# Patient Record
Sex: MALE | Race: WHITE | NOT HISPANIC OR LATINO | URBAN - METROPOLITAN AREA
[De-identification: names, ages, dates, MRNs, and addresses within clinical notes are randomized per-mention and may not be internally consistent; named-entity substitution may affect disease eponyms.]

---

## 2017-06-15 ENCOUNTER — COMMUNICATION - RIVER FALLS (OUTPATIENT)
Dept: FAMILY MEDICINE | Facility: CLINIC | Age: 47
End: 2017-06-15

## 2017-06-15 ENCOUNTER — AMBULATORY - RIVER FALLS (OUTPATIENT)
Dept: FAMILY MEDICINE | Facility: CLINIC | Age: 47
End: 2017-06-15

## 2017-06-16 LAB
CHOLEST SERPL-MCNC: 174 MG/DL (ref 125–200)
CHOLEST/HDLC SERPL: 2.3 {RATIO}
CREAT SERPL-MCNC: 0.82 MG/DL (ref 0.6–1.35)
GLUCOSE BLD-MCNC: 100 MG/DL (ref 65–99)
HDLC SERPL-MCNC: 77 MG/DL
LDLC SERPL CALC-MCNC: 85 MG/DL
NONHDLC SERPL-MCNC: 97 MG/DL
TRIGL SERPL-MCNC: 62 MG/DL

## 2017-06-23 ENCOUNTER — OFFICE VISIT - RIVER FALLS (OUTPATIENT)
Dept: FAMILY MEDICINE | Facility: CLINIC | Age: 47
End: 2017-06-23

## 2017-11-03 ENCOUNTER — AMBULATORY - RIVER FALLS (OUTPATIENT)
Dept: FAMILY MEDICINE | Facility: CLINIC | Age: 47
End: 2017-11-03

## 2017-11-04 LAB
GLUCOSE BLD-MCNC: 91 MG/DL (ref 65–99)
HBA1C MFR BLD: 5.1 %

## 2018-08-17 ENCOUNTER — AMBULATORY - RIVER FALLS (OUTPATIENT)
Dept: FAMILY MEDICINE | Facility: CLINIC | Age: 48
End: 2018-08-17

## 2018-08-18 LAB
CREAT SERPL-MCNC: 0.78 MG/DL (ref 0.7–1.33)
GLUCOSE BLD-MCNC: 94 MG/DL (ref 65–99)

## 2018-08-24 ENCOUNTER — OFFICE VISIT - RIVER FALLS (OUTPATIENT)
Dept: FAMILY MEDICINE | Facility: CLINIC | Age: 48
End: 2018-08-24

## 2018-08-24 ASSESSMENT — MIFFLIN-ST. JEOR: SCORE: 1494.23

## 2019-06-04 ENCOUNTER — OFFICE VISIT - RIVER FALLS (OUTPATIENT)
Dept: FAMILY MEDICINE | Facility: CLINIC | Age: 49
End: 2019-06-04

## 2019-06-04 ASSESSMENT — MIFFLIN-ST. JEOR: SCORE: 1482.44

## 2019-06-06 ENCOUNTER — OFFICE VISIT - RIVER FALLS (OUTPATIENT)
Dept: FAMILY MEDICINE | Facility: CLINIC | Age: 49
End: 2019-06-06

## 2019-06-06 ASSESSMENT — MIFFLIN-ST. JEOR: SCORE: 1482.44

## 2019-07-25 ENCOUNTER — OFFICE VISIT - RIVER FALLS (OUTPATIENT)
Dept: FAMILY MEDICINE | Facility: CLINIC | Age: 49
End: 2019-07-25

## 2019-07-25 ASSESSMENT — MIFFLIN-ST. JEOR: SCORE: 1472.46

## 2019-09-10 ENCOUNTER — AMBULATORY - RIVER FALLS (OUTPATIENT)
Dept: FAMILY MEDICINE | Facility: CLINIC | Age: 49
End: 2019-09-10

## 2019-09-11 ENCOUNTER — COMMUNICATION - RIVER FALLS (OUTPATIENT)
Dept: FAMILY MEDICINE | Facility: CLINIC | Age: 49
End: 2019-09-11

## 2019-09-11 LAB
BUN SERPL-MCNC: 10 MG/DL (ref 7–25)
BUN/CREAT RATIO - HISTORICAL: ABNORMAL (ref 6–22)
CALCIUM SERPL-MCNC: 10.1 MG/DL (ref 8.6–10.3)
CHLORIDE BLD-SCNC: 98 MMOL/L (ref 98–110)
CHOLEST SERPL-MCNC: 152 MG/DL
CHOLEST/HDLC SERPL: 1.8 {RATIO}
CO2 SERPL-SCNC: 23 MMOL/L (ref 20–32)
CREAT SERPL-MCNC: 0.74 MG/DL (ref 0.7–1.33)
EGFRCR SERPLBLD CKD-EPI 2021: 107 ML/MIN/1.73M2
GLUCOSE BLD-MCNC: 92 MG/DL (ref 65–99)
HDLC SERPL-MCNC: 84 MG/DL
LDLC SERPL CALC-MCNC: 56 MG/DL
NONHDLC SERPL-MCNC: 68 MG/DL
POTASSIUM BLD-SCNC: 4.7 MMOL/L (ref 3.5–5.3)
SODIUM SERPL-SCNC: 132 MMOL/L (ref 135–146)
TRIGL SERPL-MCNC: 42 MG/DL

## 2019-09-17 ENCOUNTER — OFFICE VISIT - RIVER FALLS (OUTPATIENT)
Dept: FAMILY MEDICINE | Facility: CLINIC | Age: 49
End: 2019-09-17

## 2019-09-17 ASSESSMENT — MIFFLIN-ST. JEOR: SCORE: 1486.97

## 2020-07-14 ENCOUNTER — OFFICE VISIT - RIVER FALLS (OUTPATIENT)
Dept: FAMILY MEDICINE | Facility: CLINIC | Age: 50
End: 2020-07-14

## 2020-07-14 LAB
ALBUMIN UR-MCNC: NEGATIVE G/DL
BILIRUB UR QL STRIP: NEGATIVE
GLUCOSE UR STRIP-MCNC: NEGATIVE MG/DL
HGB UR QL STRIP: NEGATIVE
KETONES UR STRIP-MCNC: ABNORMAL MG/DL
LEUKOCYTE ESTERASE UR QL STRIP: NEGATIVE
NITRATE UR QL: NEGATIVE
PH UR STRIP: 6 [PH] (ref 5–8)
SP GR UR STRIP: 1.01 (ref 1–1.03)

## 2020-07-14 ASSESSMENT — MIFFLIN-ST. JEOR: SCORE: 1437.98

## 2020-07-15 ENCOUNTER — COMMUNICATION - RIVER FALLS (OUTPATIENT)
Dept: FAMILY MEDICINE | Facility: CLINIC | Age: 50
End: 2020-07-15

## 2020-07-15 LAB
A/G RATIO - HISTORICAL: 1.6 (ref 1–2.5)
ALBUMIN SERPL-MCNC: 4.9 GM/DL (ref 3.6–5.1)
ALP SERPL-CCNC: 81 UNIT/L (ref 35–144)
ALT SERPL W P-5'-P-CCNC: 19 UNIT/L (ref 9–46)
AST SERPL W P-5'-P-CCNC: 21 UNIT/L (ref 10–35)
BILIRUB DIRECT SERPL-MCNC: 0.1 MG/DL
BILIRUB INDIRECT SERPL-MCNC: 0.5 MG/DL (ref 0.2–1.2)
BILIRUB SERPL-MCNC: 0.6 MG/DL (ref 0.2–1.2)
BUN SERPL-MCNC: 9 MG/DL (ref 7–25)
BUN/CREAT RATIO - HISTORICAL: ABNORMAL (ref 6–22)
CALCIUM SERPL-MCNC: 10.4 MG/DL (ref 8.6–10.3)
CHLORIDE BLD-SCNC: 94 MMOL/L (ref 98–110)
CO2 SERPL-SCNC: 25 MMOL/L (ref 20–32)
CREAT SERPL-MCNC: 0.71 MG/DL (ref 0.7–1.33)
EGFRCR SERPLBLD CKD-EPI 2021: 108 ML/MIN/1.73M2
ERYTHROCYTE [DISTWIDTH] IN BLOOD BY AUTOMATED COUNT: 13.2 % (ref 11–15)
GLOBULIN: 3.1 (ref 1.9–3.7)
GLUCOSE BLD-MCNC: 87 MG/DL (ref 65–99)
HCT VFR BLD AUTO: 48.5 % (ref 38.5–50)
HGB BLD-MCNC: 17.6 GM/DL (ref 13.2–17.1)
MCH RBC QN AUTO: 32.1 PG (ref 27–33)
MCHC RBC AUTO-ENTMCNC: 36.3 GM/DL (ref 32–36)
MCV RBC AUTO: 88.5 FL (ref 80–100)
PLATELET # BLD AUTO: 218 10*3/UL (ref 140–400)
PMV BLD: 9.5 FL (ref 7.5–12.5)
POTASSIUM BLD-SCNC: 5 MMOL/L (ref 3.5–5.3)
PROT SERPL-MCNC: 8 GM/DL (ref 6.1–8.1)
RBC # BLD AUTO: 5.48 10*6/UL (ref 4.2–5.8)
SODIUM SERPL-SCNC: 129 MMOL/L (ref 135–146)
WBC # BLD AUTO: 10.5 10*3/UL (ref 3.8–10.8)

## 2020-07-16 ENCOUNTER — COMMUNICATION - RIVER FALLS (OUTPATIENT)
Dept: FAMILY MEDICINE | Facility: CLINIC | Age: 50
End: 2020-07-16

## 2020-07-16 LAB — BACTERIA SPEC CULT: NORMAL

## 2022-02-11 VITALS
HEART RATE: 76 BPM | WEIGHT: 154 LBS | BODY MASS INDEX: 24.17 KG/M2 | HEIGHT: 67 IN | SYSTOLIC BLOOD PRESSURE: 168 MMHG | DIASTOLIC BLOOD PRESSURE: 100 MMHG

## 2022-02-11 VITALS
TEMPERATURE: 97 F | BODY MASS INDEX: 23.92 KG/M2 | WEIGHT: 152.4 LBS | DIASTOLIC BLOOD PRESSURE: 76 MMHG | HEART RATE: 77 BPM | SYSTOLIC BLOOD PRESSURE: 154 MMHG | OXYGEN SATURATION: 97 % | HEIGHT: 67 IN

## 2022-02-11 VITALS
HEART RATE: 72 BPM | HEIGHT: 67 IN | DIASTOLIC BLOOD PRESSURE: 72 MMHG | BODY MASS INDEX: 23.76 KG/M2 | DIASTOLIC BLOOD PRESSURE: 80 MMHG | HEIGHT: 67 IN | HEART RATE: 80 BPM | WEIGHT: 151.4 LBS | TEMPERATURE: 97.5 F | TEMPERATURE: 98 F | WEIGHT: 149.2 LBS | BODY MASS INDEX: 23.42 KG/M2 | BODY MASS INDEX: 23.76 KG/M2 | DIASTOLIC BLOOD PRESSURE: 82 MMHG | SYSTOLIC BLOOD PRESSURE: 138 MMHG | HEART RATE: 80 BPM | TEMPERATURE: 98.6 F | SYSTOLIC BLOOD PRESSURE: 130 MMHG | WEIGHT: 151.4 LBS | HEIGHT: 67 IN | SYSTOLIC BLOOD PRESSURE: 140 MMHG

## 2022-02-11 VITALS — DIASTOLIC BLOOD PRESSURE: 88 MMHG | HEART RATE: 80 BPM | SYSTOLIC BLOOD PRESSURE: 142 MMHG

## 2022-02-11 VITALS
WEIGHT: 151.8 LBS | SYSTOLIC BLOOD PRESSURE: 148 MMHG | HEART RATE: 88 BPM | DIASTOLIC BLOOD PRESSURE: 92 MMHG | TEMPERATURE: 97.6 F

## 2022-02-11 VITALS
BODY MASS INDEX: 22.22 KG/M2 | HEIGHT: 67 IN | TEMPERATURE: 97.4 F | WEIGHT: 141.6 LBS | DIASTOLIC BLOOD PRESSURE: 90 MMHG | SYSTOLIC BLOOD PRESSURE: 152 MMHG | HEART RATE: 76 BPM

## 2022-02-16 NOTE — PROGRESS NOTES
Patient:   MANNY DIAL            MRN: [id number]            FIN: [id number]               Age:   49 years     Sex:  Male     :  1970   Associated Diagnoses:   Impacted cerumen   Author:   Saw PINEDA, Maninder      Procedure   Ear foreign body removal procedure   Date/ Time:  2019 3:02:00 PM.     Confirmed: patient.     Performed by: self.     Informed consent: Verbally obtained.     Indication: hearing disturbance, impacted Cerumen.     Location: left ear, right ear.     Preparation and technique: positioned sitting upright, method including (cerumen loop, curette, irrigation with warm tap water, otologic syringe).     Results: foreign body removal complete.     Procedure tolerated: well.     No Complications.        Impression and Plan   Diagnosis     Impacted cerumen (KUZ20-HB H61.23).     Orders     F/U  if not improving, sooner if getting worse.

## 2022-02-16 NOTE — CARE COORDINATION
Pt appears on [facility] chronic disease panel as out of parameters for _HTN/Tobacco_  Pt has RTC for 6/2018 CDM, RTC placed today for CSS BP in 3 weeks.  Provider has discussed   smoking cessation with pt and he is try to quit per note 6/23/2017

## 2022-02-16 NOTE — CARE COORDINATION
Pt appears on  [facility] chronic disease panel as out of parameters for elevated BP.  RTC placed for CSS only BP check.    Rosalinda Segovia CMA.

## 2022-02-16 NOTE — NURSING NOTE
Quick Intake Entered On:  6/4/2019 3:04 PM CDT    Performed On:  6/4/2019 3:04 PM CDT by Maninder Spring MD               Summary   Systolic Blood Pressure :   138 mmHg (HI)    Diastolic Blood Pressure :   82 mmHg (HI)    Mean Arterial Pressure :   101 mmHg   Maninder Spring MD - 6/4/2019 3:04 PM CDT

## 2022-02-16 NOTE — NURSING NOTE
CAGE Assessment Entered On:  9/17/2019 4:15 PM CDT    Performed On:  9/17/2019 4:15 PM CDT by Kelli Teague CMA               Assessment   Have you ever felt you should cut down on your drinking :   No   Have people annoyed you by criticizing your drinking :   No   Have you ever felt bad or guilty about your drinking :   No   Have you ever taken a drink first thing in the morning to steady your nerves or get rid of a hangover (Eye-opener) :   No   CAGE Score :   0    eKlli Teague CMA - 9/17/2019 4:15 PM CDT

## 2022-02-16 NOTE — NURSING NOTE
Depression Screening Entered On:  9/17/2019 4:15 PM CDT    Performed On:  9/17/2019 4:15 PM CDT by Kelli Teague CMA               Depression Screening   Little Interest - Pleasure in Activities :   Not at all   Feeling Down, Depressed, Hopeless :   Not at all   Initial Depression Screen Score :   0    Trouble Falling or Staying Asleep :   Not at all   Feeling Tired or Little Energy :   Not at all   Poor Appetite or Overeating :   Not at all   Feeling Bad About Yourself :   Not at all   Trouble Concentrating :   Not at all   Moving or Speaking Slowly :   Not at all   Thoughts Better Off Dead or Hurting Self :   Not at all   Detailed Depression Screen Score :   0    Total Depression Screen Score :   0    GISEL Difficulty with Work, Home, Others :   Not difficult at all   Kelli Teague CMA - 9/17/2019 4:15 PM CDT

## 2022-02-16 NOTE — LETTER
(Inserted Image. Unable to display)   August 26, 2019      MANNY DIAL  [address] [place] [id number]        Dear MANNY,      Thank you for selecting [facility] (previously [place] [place] [facility]) for your healthcare needs.     Our records indicate you are due for the following services:    Medication Check  Fasting Lab Tests ~ Please do not eat or drink anything 10 hours prior to your scheduled appointment time.                (Water and any medications that you may need are allowed unless directed otherwise.)    If you had your labs done at another facility or with [facility] at [facility], please bring in a copy of the results to your next visit, mail a copy, or drop off a copy of your results to your Healthcare Provider.    You are due for lab work and an office visit; please schedule the lab appointment 1 week before the office visit.  This will assure all results are available to discuss with your provider during your visit.    **It is very helpful if you bring your medication bottles to your appointment.  This assures we have all of your current medications, including strength and dosing information, documented accurately in your medical record.    To schedule an appointment or if you have further questions, please contact your primary clinic:   [facility]  [phone]   [facility]  [phone]             [facility]      [phone]      Powered by [employer]    Sincerely,    Tripp Dutta PA-C

## 2022-02-16 NOTE — NURSING NOTE
Comprehensive Intake Entered On:  6/4/2019 2:45 PM CDT    Performed On:  6/4/2019 2:41 PM CDT by Ora Burton MA               Summary   Chief Complaint :   Right Ear blockage x 4 days   Weight Measured :   151.4 lb(Converted to: 151 lb 6 oz, 68.67 kg)    Height Measured :   66.5 in(Converted to: 5 ft 6 in, 168.91 cm)    Body Mass Index :   24.07 kg/m2   Body Surface Area :   1.79 m2   Systolic Blood Pressure :   170 mmHg (HI)    Diastolic Blood Pressure :   90 mmHg (HI)    Mean Arterial Pressure :   117 mmHg   Peripheral Pulse Rate :   72 bpm   BP Site :   Right arm   Pulse Site :   Radial artery   BP Method :   Manual   HR Method :   Manual   Temperature Tympanic :   98.6 DegF(Converted to: 37.0 DegC)    Ora Burton MA - 6/4/2019 2:41 PM CDT   Health Status   Allergies Verified? :   Yes   Medication History Verified? :   Yes   Medical History Verified? :   Yes   Pre-Visit Planning Status :   Completed   Tobacco Use? :   Current every day smoker   Ora Burton MA - 6/4/2019 2:41 PM CDT   Consents   Consent for Immunization Exchange :   Consent Granted   Consent for Immunizations to Providers :   Consent Granted   Ora Burton MA - 6/4/2019 2:41 PM CDT   Meds / Allergies   (As Of: 6/4/2019 2:45:04 PM CDT)   Allergies (Active)   No Known Medication Allergies  Estimated Onset Date:   Unspecified ; Created By:   Kelli Teague CMA; Reaction Status:   Active ; Category:   Drug ; Substance:   No Known Medication Allergies ; Type:   Allergy ; Updated By:   Kelli Teague CMA; Reviewed Date:   6/4/2019 2:43 PM CDT        Medication List   (As Of: 6/4/2019 2:45:04 PM CDT)   Prescription/Discharge Order    amlodipine-telmisartan  :   amlodipine-telmisartan ; Status:   Prescribed ; Ordered As Mnemonic:   amlodipine-telmisartan 10 mg-40 mg oral tablet ; Simple Display Line:   1 tab(s), Oral, daily, med change, 90 tab(s), 3 Refill(s) ; Ordering Provider:   Tripp Dutta PA-C; Catalog Code:   amlodipine-telmisartan ;  Order Dt/Tm:   8/24/2018 3:54:45 PM

## 2022-02-16 NOTE — LETTER
(Inserted Image. Unable to display)   July 16, 2020      MANNY DIAL      [address] [place], [place] [id number]        Dear MANNY,    Thank you for selecting [facility] for your healthcare needs.  Below you will find the results of your recent tests done at our clinic.     Urine culture with no bacterial growth      Result Name Current Result   Urine Culture  See comment 7/14/2020       Please contact me or my assistant at [phone] if you have any questions about your results.     Sincerely,        Darci Monge MD        What do your labs mean?  Below is a glossary of commonly ordered labs:  LDL   Bad Cholesterol   HDL   Good Cholesterol  AST/ALT   Liver Function   Cr/Creatinine   Kidney Function  Microalbumin   Kidney Function  BUN   Kidney Function  PSA   Prostate    TSH   Thyroid Hormone  HgbA1c   Diabetes Test   Hgb (Hemoglobin)   Red Blood Cells

## 2022-02-16 NOTE — PROGRESS NOTES
Patient:   MANNY DIAL            MRN: [id number]            FIN: [id number]               Age:   47 years     Sex:  Male     :  1970   Associated Diagnoses:   Hypertension; IGT (impaired glucose tolerance)   Author:   Tripp Dutta PA-C      Visit Information      Date of Service: 2017 03:26 pm  Performing Location: [facility]  Encounter#: [id number]      Primary Care Provider (PCP):  Tripp Dutta PA-C    NPI# [id number]      Referring Provider:  Tripp Dutta PA-C# [id number]   Visit type:  Scheduled follow-up.    Accompanied by:  No one.    Source of history:  Self, Medical record.       Chief Complaint   2017 3:29 PM CDT    HTN med check and med refil        History of Present Illness             The patient presents for follow-up evaluation of hypertension.  The quality of hypertension symptom(s) since the patient's last visit is described as decreased.  The severity of the hypertension symptom(s) since the last visit is moderate.  Since the patient's last visit, the timing/course of hypertension symptom(s) is constant.  Off Meds since November. Lifestyle changes. Feels great. Still smoking but has cut down. CC above noted and confirmed with the patient. No side effects. Recent labs..        Review of Systems   Constitutional:  Negative.    Respiratory:  Negative.    Cardiovascular:  Negative except as documented in history of present illness.       Health Status   Allergies:    Allergic Reactions (All)  No Known Medication Allergies   Medications:  (Selected)   Prescriptions  Prescribed  amLODIPine 10 mg oral tablet: 1 tab(s) ( 10 mg ), PO, Daily, Instructions: new dose, # 90 tab(s), 3 Refill(s), Type: Maintenance, Pharmacy: [facility] #[id number], 1 tab(s) po daily,Instr:new dose   Problem list:    All Problems  Hypertension / SNOMED CT 8362059949 / Confirmed      Histories   Past Medical History:    No active or resolved past medical history items have been selected  or recorded.   Family History:    No family history items have been selected or recorded.   Procedure history:    No active procedure history items have been selected or recorded.   Social History:             No active social history items have been recorded.      Physical Examination   Vital Signs   6/23/2017 3:29 PM CDT Temperature Tympanic 97.6 DegF  LOW    Peripheral Pulse Rate 88 bpm    Pulse Site Radial artery    HR Method Manual    Systolic Blood Pressure 150 mmHg  HI    Diastolic Blood Pressure 98 mmHg  HI    Mean Arterial Pressure 115 mmHg    BP Site Right arm    BP Method Manual      Measurements from flowsheet : Measurements   6/23/2017 3:29 PM CDT    Weight Measured - Standard                151.8 lb     General:  Alert and oriented, No acute distress.    Eye:  Pupils are equal, round and reactive to light, Extraocular movements are intact, Normal conjunctiva.    HENT:  Normocephalic, Tympanic membranes are clear, Oral mucosa is moist, No pharyngeal erythema.    Neck:  Supple, Non-tender, No lymphadenopathy.    Respiratory:  Lungs are clear to auscultation, Respirations are non-labored, Breath sounds are equal.    Cardiovascular:  Normal rate, Regular rhythm, No murmur.    Gastrointestinal:  Soft, Non-tender, Non-distended, Normal bowel sounds, No organomegaly.       Review / Management   Results review:  Lab results   6/15/2017 10:48 AM CDT Sodium Level 130 mmol/L  LOW    Potassium Level 4.7 mmol/L    Chloride Level 99 mmol/L    CO2 Level 24 mmol/L    Glucose Level 100 mg/dL  HI    BUN 11 mg/dL    Creatinine Level 0.82 mg/dL    BUN/Creat Ratio NOT APPLICABLE    eGFR 104 mL/min/1.73m2    eGFR African American 120 mL/min/1.73m2    Calcium Level 9.9 mg/dL    Cholesterol 174 mg/dL    Non-HDL Cholesterol 97    HDL 77 mg/dL    Cholesterol/HDL Ratio 2.3    LDL 85    Triglyceride 62 mg/dL    WBC 9.1    RBC 5.42    Hgb 17.2 gm/dL  HI    Hct 49.0 %    MCV 90.4 fL    MCH 31.7 pg    MCHC 35.1 gm/dL    RDW 13.3 %     Platelet 234    MPV 9.5 fL   .       Impression and Plan   Diagnosis     Hypertension (GOI92-HO I10).     IGT (impaired glucose tolerance) (FXZ73-HS R73.02).     Patient Instructions:       Counseled: Patient, Regarding diagnosis, Regarding treatment, Regarding medications, Diet, Activity, Smoking cessation, Verbalized understanding.    Orders     Orders (Selected)   Outpatient Orders  Ordered  Return to Clinic (Request): RFV: FBS A1c, Return in 3 months  Prescriptions  Prescribed  amLODIPine 10 mg oral tablet: 1 tab(s) ( 10 mg ), PO, Daily, Instructions: new dose, # 90 tab(s), 3 Refill(s), Type: Maintenance, Pharmacy: [facility] #[id number], 1 tab(s) po daily,Instr:new dose.     FU for labs in 3 months. BP check in 3-4 weeks after back on medications.

## 2022-02-16 NOTE — LETTER
(Inserted Image. Unable to display)   [address] [place]  [address]  [phone]    September 11, 2019      MANNY YOJANA      [address] [place] [id number]        Dear MANNY,    Thank you for selecting [facility] for your healthcare needs. Below you will find the result of your recent test(s) done at our clinic.     These are excellent      Result Name Current Result Previous Result Reference Range   Sodium Level (mmol/L) ((L)) 132 9/10/2019 ((L)) 133 8/17/2018 135 - 146   Potassium Level (mmol/L)  4.7 9/10/2019  5.2 8/17/2018 3.5 - 5.3   Chloride Level (mmol/L)  98 9/10/2019  102 8/17/2018 98 - 110   CO2 Level (mmol/L)  23 9/10/2019  25 8/17/2018 20 - 32   Glucose Level (mg/dL)  92 9/10/2019  94 8/17/2018 65 - 99   BUN (mg/dL)  10 9/10/2019  13 8/17/2018 7 - 25   Creatinine Level (mg/dL)  0.74 9/10/2019  0.78 8/17/2018 0.70 - 1.33   Calcium Level (mg/dL)  10.1 9/10/2019  10.3 8/17/2018 8.6 - 10.3   Cholesterol (mg/dL)  152 9/10/2019   - <200   Non-HDL Cholesterol  68 9/10/2019   - <130   HDL (mg/dL)  84 9/10/2019  >40 -    Cholesterol/HDL Ratio  1.8 9/10/2019   - <5.0   LDL  56 9/10/2019     Triglyceride (mg/dL)  42 9/10/2019   - <150       Please contact my practice at [phone] if you have any questions or concerns.      Sincerely,        Maninder Spring MD ,   Francia Molina MD,   Tripp Dutta PA-C

## 2022-02-16 NOTE — LETTER
(Inserted Image. Unable to display)   January 09, 2019      MANNY DIAL  [address] [place] [id number]        Dear MNANY,      Thank you for selecting [facility] (previously [place] [place] [facility]) for your healthcare needs.     Our records indicate you are due for the following services:     Clinical Support Staff (CSS)-Only Blood Pressure Check ~ Please stop in anytime to have your blood pressure rechecked. This is a free service and no appointment necessary.    So we can best determine if your medications are effective in lowering your blood pressure, please take your medications 1-2 hours before coming in to have your blood pressure checked.  We encourage you to avoid caffeine or other stimulants prior to having your blood pressure checked and come at a time when you are not feeling rushed.     If you check your blood pressure at home, please bring in your blood pressure monitor and home blood pressure readings.  We will check your machine for accuracy and also share your home readings with your Healthcare Provider.     To schedule an appointment or if you have further questions, please contact your primary clinic:   [facility]          [phone]   [facility]    [phone]             [facility]         [phone]      Powered by [employer]    Sincerely,    Tripp Dutta PA-C

## 2022-02-16 NOTE — PROGRESS NOTES
Patient:   MANNY DIAL            MRN: [id number]            FIN: [id number]               Age:   48 years     Sex:  Male     :  1970   Associated Diagnoses:   Hypertension   Author:   Tripp Dutta PA-C      Visit Information      Date of Service: 2018 03:37 pm  Performing Location: [facility]  Encounter#: [id number]      Primary Care Provider (PCP):  Tripp Dutta PA-C    NPI# [id number]      Referring Provider:  Tripp Dutta PA-C# [id number]   Visit type:  Scheduled follow-up.    Accompanied by:  No one.    Source of history:  Self, Medical record.       Chief Complaint   2018 3:41 PM CDT    HTN med check and med refills; has been out of pills for awhile        History of Present Illness             The patient presents for follow-up evaluation of hypertension.  The quality of hypertension symptom(s) since the patient's last visit is described as decreased.  The severity of the hypertension symptom(s) since the last visit is moderate.  Since the patient's last visit, the timing/course of hypertension symptom(s) is constant.  Off medication for 1.5 months. Work stressful Still smoking. Had recent labs. Feels he needs tighter control of BP. CC above noted and confirmed with the patient..        Review of Systems   Constitutional:  Negative.    Respiratory:  Negative.    Cardiovascular:  Negative except as documented in history of present illness.       Health Status   Allergies:    Allergic Reactions (All)  No Known Medication Allergies   Medications:  (Selected)   Prescriptions  Prescribed  amlodipine-telmisartan 10 mg-40 mg oral tablet: 1 tab(s), Oral, daily, Instructions: med change, # 90 tab(s), 3 Refill(s), Type: Maintenance, Pharmacy: [facility] #[id number], 1 tab(s) Oral daily,Instr:med change   Problem list:    All Problems  Hypertension / SNOMED CT [id number] / Confirmed      Histories   Past Medical History:    No active or resolved past medical history items have  been selected or recorded.   Family History:    No family history items have been selected or recorded.   Procedure history:    No active procedure history items have been selected or recorded.   Social History:             No active social history items have been recorded.      Physical Examination   Vital Signs   8/24/2018 3:41 PM CDT Peripheral Pulse Rate 76 bpm    Pulse Site Radial artery    HR Method Manual    Systolic Blood Pressure 168 mmHg  HI    Diastolic Blood Pressure 100 mmHg  HI    Mean Arterial Pressure 123 mmHg    BP Site Left arm    BP Method Manual      Measurements from flowsheet : Measurements   8/24/2018 3:41 PM CDT Height Measured - Standard 66.5 in    Weight Measured - Standard 154.0 lb    BSA 1.81 m2    Body Mass Index 24.48 kg/m2      General:  Alert and oriented, No acute distress.    Eye:  Pupils are equal, round and reactive to light, Extraocular movements are intact, Normal conjunctiva.    HENT:  Normocephalic, Tympanic membranes are clear, Oral mucosa is moist, No pharyngeal erythema.    Neck:  Supple, Non-tender, No lymphadenopathy.    Respiratory:  Lungs are clear to auscultation, Respirations are non-labored, Breath sounds are equal.    Cardiovascular:  Normal rate, Regular rhythm, No murmur.    Gastrointestinal:  Soft, Non-tender, Non-distended, Normal bowel sounds, No organomegaly.       Review / Management   Results review:  Lab results   8/17/2018 8:08 AM CDT Sodium Level 133 mmol/L  LOW    Potassium Level 5.2 mmol/L    Chloride Level 102 mmol/L    CO2 Level 25 mmol/L    Glucose Level 94 mg/dL    BUN 13 mg/dL    Creatinine Level 0.78 mg/dL    BUN/Creat Ratio NOT APPLICABLE    eGFR 105 mL/min/1.73m2    eGFR African American 122 mL/min/1.73m2    Calcium Level 10.3 mg/dL   .       Impression and Plan   Diagnosis     Hypertension (RUT78-GR I10).     Patient Instructions:       Counseled: Patient, Regarding diagnosis, Regarding treatment, Regarding medications, Diet, Activity, Smoking  cessation, Verbalized understanding.    Orders     Orders (Selected)   Prescriptions  Prescribed  amlodipine-telmisartan 10 mg-40 mg oral tablet: 1 tab(s), Oral, daily, Instructions: med change, # 90 tab(s), 3 Refill(s), Type: Maintenance, Pharmacy: [facility] #[id number], 1 tab(s) Oral daily,Instr:med change.     BP checks in 3-4 weeks when back on medication. Refuses colon cancer screenings.

## 2022-02-16 NOTE — LETTER
(Inserted Image. Unable to display)     January 25, 2021      MANNY YOJANA  [address] [place], [place] [phone]          Dear MANNY,      Thank you for selecting [facility] ([facility]viously [facility]) for your healthcare needs.    Our records indicate you are due for the following services:     Hypertension check ~ please remember to bring your at-home blood pressure readings with you to your appointment.     (FYI   Regarding office visits: In some instances, a video visit or telephone visit may be offered as an option.)      To schedule an appointment or if you have further questions, please contact your clinic at [phone].      Powered by [employer]    Sincerely,    Tripp Dutta PA-C

## 2022-02-16 NOTE — NURSING NOTE
Comprehensive Intake Entered On:  7/14/2020 1:45 PM CDT    Performed On:  7/14/2020 1:38 PM CDT by Kay Sadler CMA               Summary   Chief Complaint :   F/u ER 6/11/20 still having blood in stool, nausea, difficulty with urination with burning sensation    Weight Measured :   141.6 lb(Converted to: 141 lb 10 oz, 64.23 kg)    Height Measured :   66.5 in(Converted to: 5 ft 6 in, 168.91 cm)    Body Mass Index :   22.51 kg/m2   Body Surface Area :   1.73 m2   Systolic Blood Pressure :   152 mmHg (HI)    Diastolic Blood Pressure :   90 mmHg (HI)    Mean Arterial Pressure :   111 mmHg   Peripheral Pulse Rate :   76 bpm   BP Site :   Right arm   Pulse Site :   Radial artery   BP Method :   Manual   HR Method :   Manual   Temperature Tympanic :   97.4 DegF(Converted to: 36.3 DegC)  (LOW)    Kay Sadler CMA - 7/14/2020 1:38 PM CDT   Health Status   Allergies Verified? :   Yes   Medication History Verified? :   Yes   Medical History Verified? :   No   Pre-Visit Planning Status :   Completed   Tobacco Use? :   Current every day smoker   Kay Sadler CMA - 7/14/2020 1:38 PM CDT   Consents   Consent for Immunization Exchange :   Consent Granted   Consent for Immunizations to Providers :   Consent Granted   Kay Sadler CMA - 7/14/2020 1:38 PM CDT   Meds / Allergies   (As Of: 7/14/2020 1:45:02 PM CDT)   Allergies (Active)   penicillin  Estimated Onset Date:   Unspecified ; Reactions:   Nausea ; Created By:   Kay Sadler CMA; Reaction Status:   Active ; Category:   Drug ; Substance:   penicillin ; Type:   Side Effect ; Updated By:   Kay Sadler CMA; Reviewed Date:   7/14/2020 1:43 PM CDT        Medication List   (As Of: 7/14/2020 1:45:02 PM CDT)   Prescription/Discharge Order    amlodipine-telmisartan  :   amlodipine-telmisartan ; Status:   Prescribed ; Ordered As Mnemonic:   amlodipine-telmisartan 10 mg-40 mg oral tablet ; Simple Display Line:   1 tab(s), Oral, daily, 90 tab(s), 3 Refill(s) ; Ordering  Provider:   Tripp Dutta PA-C; Catalog Code:   amlodipine-telmisartan ; Order Dt/Tm:   9/17/2019 3:36:49 PM CDT            ID Risk Screen   Recent Travel History :   No recent travel   Family Member Travel History :   No recent travel   Other Exposure to Infectious Disease :   Unknown   Kay Sadler CMA - 7/14/2020 1:38 PM CDT

## 2022-02-16 NOTE — PROGRESS NOTES
Patient:   MANNY DIAL            MRN: [id number]            FIN: [id number]               Age:   50 years     Sex:  Male     :  1970   Associated Diagnoses:   Abdominal pain; Hyponatremia   Author:   Darci Monge MD      Visit Information      Date of Service: 2020 01:31 pm  Performing Location: [facility]  Encounter#: [id number]      Primary Care Provider (PCP):  Tripp Dutta PA-C    NPI# [id number]      Referring Provider:  Darci Monge MD    NPI# [id number]      Chief Complaint   2020 1:38 PM CDT    F/u ER 20 still having blood in stool, nausea, difficulty with urination with burning sensation      History of Present Illness   Seen in ER a month ago. Abdominal pain began beginning of . Seen in ER with normal CT abdomen. Pain had been in the lower abdomen. Took omeprazole without benefit. Pain did decrease for a couple weeks but now starting to increase.  Pain currently low in the abdomen and middle over suprapubic region.  Pain sometimes increased with eating.  Bowel movements are usually once a day. Has had some harder stools. Had some diarrhea last week.  Every other day has some red blood in the toilet water for the past 5-6 weeks. Notices the blood more with hard stools.  Drinks about a 6 beers a day. Drinking increased with the Pandemic.      Review of Systems   Constitutional:  No fever.    Respiratory:  No shortness of breath.    Cardiovascular:  No chest pain.    Gastrointestinal:  No vomiting.    Has dysuria. No significant urinary frequency      Health Status   Allergies:    Nonallergic Reactions (Selected)  Severity Not Documented  Penicillin (Nausea)   Medications:  (Selected)   Prescriptions  Prescribed  amlodipine-telmisartan 10 mg-40 mg oral tablet: 1 tab(s), Oral, daily, # 90 tab(s), 3 Refill(s), Type: Maintenance, Pharmacy: [facility], 1 tab(s) Oral daily   Problem list:    All Problems  Hypertension / SNOMED CT 2271423677 /  Confirmed  Current tobacco use / SNOMED CT 584766970 / Confirmed      Histories   Social History: Single. No children. 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 Smoker 1ppd.   Family History: no colon cancer. Dad  age 79 Lung cancer. Mom alive. Has seven siblings alive. Sister (Katia)  age 53 heart attack.      Physical Examination   Vital Signs   2020 1:38 PM CDT Temperature Tympanic 97.4 DegF  LOW    Peripheral Pulse Rate 76 bpm    Pulse Site Radial artery    HR Method Manual    Systolic Blood Pressure 152 mmHg  HI    Diastolic Blood Pressure 90 mmHg  HI    Mean Arterial Pressure 111 mmHg    BP Site Right arm    BP Method Manual      Measurements from flowsheet : Measurements   2020 1:38 PM CDT Height Measured - Standard 66.5 in    Weight Measured - Standard 141.6 lb    BSA 1.73 m2    Body Mass Index 22.51 kg/m2      General:  Alert and oriented, No acute distress.    Eye:  Normal conjunctiva.    Neck:  No lymphadenopathy.    Respiratory:  Lungs are clear to auscultation.    Cardiovascular:  Normal rate, Regular rhythm.    Gastrointestinal:  Soft, Non-tender, Non-distended.    Genitourinary:  No costovertebral angle tenderness.    Musculoskeletal:  Normal gait.    Psychiatric:  Appropriate mood & affect.    Rectal: external hemorrhoid. No fissures. No other masses or lesions      Review / Management   CT abdomen 2020  IMPRESSION:   1.  No acute abnormality identified in the abdomen or pelvis. No findings to  explain the abdominal pain.    Sodium 126 in ER. Hgb 16.1 in ER with urine culture no growth   Results review:  Lab results   2020 2:32 PM CDT UA Glucose NEGATIVE    UA Ketones TRACE    Urine Occult Blood NEGATIVE    UA Protein NEGATIVE    UA Nitrite NEGATIVE    UA Leukocyte Esterase NEGATIVE     .       Impression and Plan   Diagnosis     Abdominal pain (TGE85-KJ R10.9).     Hyponatremia (BMB94-LR E87.1).       Abdominal pain: possible constipation and treat with miralax  twice daily and follow up in 3-4 weeks. Discussed and hold off on Colonoscopy (prefers virtual if needed) for now.   Hyponatremia: recheck and likely from Alcoholism. Refer to [facility]

## 2022-02-16 NOTE — LETTER
(Inserted Image. Unable to display)   July 15, 2020      MANNY DIAL      [address] [place], [place] [id number]        Dear MANNY,    Thank you for selecting [facility] for your healthcare needs.  Below you will find the results of your recent tests done at our clinic.     Sodium still low (likely from alcohol)  Calcium minimally elevated  REst of labs are good.      Result Name Current Result Previous Result Reference Range   Sodium Level (mmol/L) ((L)) 129 7/14/2020 ((L)) 132 9/10/2019  ((L)) 133 8/17/2018 135 - 146   Potassium Level (mmol/L)  5.0 7/14/2020  3.5 - 5.3   Glucose Level (mg/dL)  87 7/14/2020  65 - 99   Creatinine Level (mg/dL)  0.71 7/14/2020  0.74 9/10/2019   0.78 8/17/2018 0.70 - 1.33   Calcium Level (mg/dL) ((H)) 10.4 7/14/2020  10.1 9/10/2019   10.3 8/17/2018 8.6 - 10.3   AST/SGOT (unit/L)  21 7/14/2020  10 - 35   ALT/SGPT (unit/L)  19 7/14/2020  9 - 46   WBC  10.5 7/14/2020  3.8 - 10.8   Hgb (gm/dL) ((H)) 17.6 7/14/2020  13.2 - 17.1   Platelet  218 7/14/2020  140 - 400       Please contact me or my assistant at [phone] if you have any questions about your results.     Sincerely,        Darci Monge MD        What do your labs mean?  Below is a glossary of commonly ordered labs:  LDL   Bad Cholesterol   HDL   Good Cholesterol  AST/ALT   Liver Function   Cr/Creatinine   Kidney Function  Microalbumin   Kidney Function  BUN   Kidney Function  PSA   Prostate    TSH   Thyroid Hormone  HgbA1c   Diabetes Test   Hgb (Hemoglobin)   Red Blood Cells

## 2022-02-16 NOTE — NURSING NOTE
Comprehensive Intake Entered On:  7/25/2019 10:46 AM CDT    Performed On:  7/25/2019 10:43 AM CDT by Ora Burton MA               Summary   Chief Complaint :   Right ear pain x 4 days   Weight Measured :   149.2 lb(Converted to: 149 lb 3 oz, 67.68 kg)    Height Measured :   66.5 in(Converted to: 5 ft 6 in, 168.91 cm)    Body Mass Index :   23.72 kg/m2   Body Surface Area :   1.78 m2   Systolic Blood Pressure :   140 mmHg (HI)    Diastolic Blood Pressure :   80 mmHg   Mean Arterial Pressure :   100 mmHg   Peripheral Pulse Rate :   80 bpm   BP Site :   Left arm   Pulse Site :   Radial artery   BP Method :   Manual   HR Method :   Manual   Temperature Tympanic :   97.5 DegF(Converted to: 36.4 DegC)  (LOW)    Ora Burton MA - 7/25/2019 10:43 AM CDT   Health Status   Allergies Verified? :   Yes   Medication History Verified? :   Yes   Medical History Verified? :   Yes   Pre-Visit Planning Status :   Completed   Tobacco Use? :   Current every day smoker   Ora Burton MA - 7/25/2019 10:43 AM CDT   Consents   Consent for Immunization Exchange :   Consent Denied   Consent for Immunizations to Providers :   Consent Granted   Ora Burton MA - 7/25/2019 10:43 AM CDT   Meds / Allergies   (As Of: 7/25/2019 10:46:27 AM CDT)   Allergies (Active)   No Known Medication Allergies  Estimated Onset Date:   Unspecified ; Created By:   Kelli Teague CMA; Reaction Status:   Active ; Category:   Drug ; Substance:   No Known Medication Allergies ; Type:   Allergy ; Updated By:   Kelli Teague CMA; Reviewed Date:   7/25/2019 10:44 AM CDT        Medication List   (As Of: 7/25/2019 10:46:27 AM CDT)   Prescription/Discharge Order    amlodipine-telmisartan  :   amlodipine-telmisartan ; Status:   Prescribed ; Ordered As Mnemonic:   amlodipine-telmisartan 10 mg-40 mg oral tablet ; Simple Display Line:   1 tab(s), Oral, daily, med change, 90 tab(s), 3 Refill(s) ; Ordering Provider:   Tripp Dutta PA-C; Catalog Code:    amlodipine-telmisartan ; Order Dt/Tm:   8/24/2018 3:54:45 PM

## 2022-02-16 NOTE — PROGRESS NOTES
Patient:   MANNY DIAL            MRN: [id number]            FIN: [id number]               Age:   49 years     Sex:  Male     :  1970   Associated Diagnoses:   Right acute suppurative otitis media   Author:   Tripp Dutta PA-C      Report Summary   Diagnosis  Right acute suppurative otitis media (BLT26-SR H66.001).  Patient InstructionsOrders   Visit Information      Date of Service: 2019 10:38 am  Performing Location: [facility]  Encounter#: [id number]   Visit type:  General concerns.    Accompanied by:  No one.    Source of history:  Self.    Referral source:  Self.    History limitation:  None.       Chief Complaint   2019 10:43 AM CDT   Right ear pain x 4 days      History of Present Illness             The patient presents with earache.  The location of the earache is the right ear.  The earache is characterized by pain and sensation of fullness.  The severity of the earache is moderate.  The earache is episodic and fluctuates in intensity.  Finished antibiotics, oral and topical for SHAHEEN. [place] some better, now feels plugged and popping. No nasal congestion. No fever. No drainage. CC above noted and confirmed with the patient..        Review of Systems   Constitutional:  Negative.    Ear/Nose/Mouth/Throat:  Negative except as documented in history of present illness.       Health Status   Allergies:    Allergic Reactions (All)  No Known Medication Allergies   Medications:  (Selected)   Prescriptions  Prescribed  amlodipine-telmisartan 10 mg-40 mg oral tablet: 1 tab(s), Oral, daily, Instructions: med change, # 90 tab(s), 3 Refill(s), Type: Maintenance, Pharmacy: [facility] #[id number], 1 tab(s) Oral daily,Instr:med change   Problem list:    All Problems  Hypertension / SNOMED CT 7800207589 / Confirmed      Histories   Past Medical History:    No active or resolved past medical history items have been selected or recorded.   Family History:    No family history items have been  selected or recorded.   Procedure history:    No active procedure history items have been selected or recorded.   Social History:             No active social history items have been recorded.      Physical Examination   Vital Signs   7/25/2019 10:43 AM CDT Temperature Tympanic 97.5 DegF  LOW    Peripheral Pulse Rate 80 bpm    Pulse Site Radial artery    HR Method Manual    Systolic Blood Pressure 140 mmHg  HI    Diastolic Blood Pressure 80 mmHg    Mean Arterial Pressure 100 mmHg    BP Site Left arm    BP Method Manual      Measurements from flowsheet : Measurements   7/25/2019 10:43 AM CDT Height Measured - Standard 66.5 in    Weight Measured - Standard 149.2 lb    BSA 1.78 m2    Body Mass Index 23.72 kg/m2      HENT:  Normocephalic, Oral mucosa is moist.         Ear: Right ear, Tympanic membrane ( Intact, Fluid in middle ear, Debris in canal. Lavaged. TM with effusion. Canal not swollen or erythematous. ).       Impression and Plan   Diagnosis     Right acute suppurative otitis media (ZPD45-UU H66.001).     Patient Instructions:       Counseled: Patient, Regarding diagnosis, Regarding treatment, Regarding medications, Activity, Verbalized understanding.    Orders     Orders (Selected)   Prescriptions  Prescribed  sulfamethoxazole-trimethoprim 800 mg-160 mg oral tablet: 1 tab(s), PO, BID, x 7 day(s), # 14 tab(s), 0 Refill(s), Type: Acute, Pharmacy: [facility], 1 tab(s) Oral bid,x7 day(s).     Take medicine as prescribed, side effects discussed.  Tylenol/ibuprofen for fever and discomfort.  Push fluids.  RTC if not improving in 36-48 hours, prior if concerns as we have discussed.

## 2022-02-16 NOTE — NURSING NOTE
Comprehensive Intake Entered On:  9/17/2019 3:28 PM CDT    Performed On:  9/17/2019 3:25 PM CDT by Kelli Teague CMA               Summary   Chief Complaint :   HTN med check; refills   Kelli Teague CMA - 9/17/2019 3:33 PM CDT   Weight Measured :   152.4 lb(Converted to: 152 lb 6 oz, 69.13 kg)    Height Measured :   66.5 in(Converted to: 5 ft 6 in, 168.91 cm)    Body Mass Index :   24.23 kg/m2   Body Surface Area :   1.8 m2   Systolic Blood Pressure :   154 mmHg (HI)    Diastolic Blood Pressure :   76 mmHg   Mean Arterial Pressure :   102 mmHg   Peripheral Pulse Rate :   77 bpm   BP Site :   Right arm   BP Method :   Manual   HR Method :   Electronic   Temperature Tympanic :   97.0 DegF(Converted to: 36.1 DegC)  (LOW)    Oxygen Saturation :   97 %   Kelli Teague CMA - 9/17/2019 3:25 PM CDT   Health Status   Tobacco Use? :   Current every day smoker   Tobacco Cessation Review :   Not ready to quit   Kelli Teague CMA - 9/17/2019 3:33 PM CDT   Allergies Verified? :   Yes   Medication History Verified? :   Yes   Medical History Verified? :   Yes   Pre-Visit Planning Status :   Completed   Kelli Teague CMA - 9/17/2019 3:25 PM CDT   Consents   Consent for Immunization Exchange :   Consent Granted   Consent for Immunizations to Providers :   Consent Granted   Kelli Teague CMA - 9/17/2019 3:25 PM CDT   Meds / Allergies   (As Of: 9/17/2019 3:33:46 PM CDT)   Allergies (Active)   No Known Medication Allergies  Estimated Onset Date:   Unspecified ; Created By:   Kelli Teague CMA; Reaction Status:   Active ; Category:   Drug ; Substance:   No Known Medication Allergies ; Type:   Allergy ; Updated By:   Kelli Teague CMA; Reviewed Date:   9/17/2019 3:33 PM CDT        Medication List   (As Of: 9/17/2019 3:33:46 PM CDT)   Prescription/Discharge Order    amlodipine-telmisartan  :   amlodipine-telmisartan ; Status:   Prescribed ; Ordered As Mnemonic:   amlodipine-telmisartan 10 mg-40 mg oral tablet ; Simple Display Line:   1  tab(s), Oral, daily, med change, 90 tab(s), 3 Refill(s) ; Ordering Provider:   Tripp Dutta PA-C; Catalog Code:   amlodipine-telmisartan ; Order Dt/Tm:   8/24/2018 3:54:45 PM

## 2022-02-16 NOTE — PROGRESS NOTES
Patient:   MANNY DIAL            MRN: [id number]            FIN: [id number]               Age:   49 years     Sex:  Male     :  1970   Associated Diagnoses:   Current tobacco use; Hypertension   Author:   Tripp Dutta PA-C      Report Summary   Diagnosis  Current tobacco use (VRM57-CB Z72.0).  Patient Instructions   Visit Information      Date of Service: 2019 03:20 pm  Performing Location: [facility]  Encounter#: [id number]      Primary Care Provider (PCP):  Tripp Dutta PA-C, NPI# [id number]      Referring Provider:  Tripp Dutta PA-C# [id number]   Visit type:  Scheduled follow-up.    Accompanied by:  No one.    Source of history:  Self, Medical record.       Chief Complaint   2019 3:33 PM CDT    HTN med check; refills  (Modified)       History of Present Illness             The patient presents for follow-up evaluation of hypertension.  The quality of hypertension symptom(s) since the patient's last visit is described as decreased.  The severity of the hypertension symptom(s) since the last visit is moderate.  Since the patient's last visit, the timing/course of hypertension symptom(s) is constant.  Off medication for 1.5 months. Work stressful Still smoking. Had recent labs. Out of Meds for about two weeks. CC above noted and confirmed with the patient..        Review of Systems   Constitutional:  Negative.    Ear/Nose/Mouth/Throat:  Negative.    Respiratory:  Negative.    Cardiovascular:  Negative except as documented in history of present illness.    Gastrointestinal:  Negative.       Health Status   Allergies:    Allergic Reactions (All)  No Known Medication Allergies   Medications:  (Selected)   Prescriptions  Prescribed  amlodipine-telmisartan 10 mg-40 mg oral tablet: 1 tab(s), Oral, daily, Instructions: med change, # 90 tab(s), 3 Refill(s), Type: Maintenance, Pharmacy: [id number], 1 tab(s) Oral daily,Instr:med change   Problem list:    All  Problems  Hypertension / SNOMED CT 7944070983 / Confirmed      Histories   Past Medical History:    No active or resolved past medical history items have been selected or recorded.   Family History:    No family history items have been selected or recorded.   Procedure history:    No active procedure history items have been selected or recorded.   Social History:             No active social history items have been recorded.      Physical Examination   Vital Signs   9/17/2019 3:25 PM CDT Temperature Tympanic 97.0 DegF  LOW    Peripheral Pulse Rate 77 bpm    HR Method Electronic    Systolic Blood Pressure 154 mmHg  HI    Diastolic Blood Pressure 76 mmHg    Mean Arterial Pressure 102 mmHg    BP Site Right arm    BP Method Manual    Oxygen Saturation 97 %      Measurements from flowsheet : Measurements   9/17/2019 3:25 PM CDT Height Measured - Standard 66.5 in    Weight Measured - Standard 152.4 lb    BSA 1.8 m2    Body Mass Index 24.23 kg/m2      General:  Alert and oriented, No acute distress.    Eye:  Pupils are equal, round and reactive to light, Extraocular movements are intact, Normal conjunctiva.    HENT:  Normocephalic, Tympanic membranes are clear, Oral mucosa is moist, No pharyngeal erythema.    Neck:  Supple, Non-tender, No lymphadenopathy.    Respiratory:  Lungs are clear to auscultation, Respirations are non-labored, Breath sounds are equal.    Cardiovascular:  Normal rate, Regular rhythm, No murmur.    Gastrointestinal:  Soft, Non-tender, Non-distended, Normal bowel sounds, No organomegaly.       Review / Management   Results review:  Lab results   9/10/2019 10:59 AM CDT Sodium Level 132 mmol/L  LOW    Potassium Level 4.7 mmol/L    Chloride Level 98 mmol/L    CO2 Level 23 mmol/L    Glucose Level 92 mg/dL    BUN 10 mg/dL    Creatinine Level 0.74 mg/dL    BUN/Creat Ratio NOT APPLICABLE    eGFR 107 mL/min/1.73m2    eGFR African American 124 mL/min/1.73m2    Calcium Level 10.1 mg/dL    Cholesterol 152 mg/dL     Non-HDL Cholesterol 68    HDL 84 mg/dL    Cholesterol/HDL Ratio 1.8    LDL 56    Triglyceride 42 mg/dL   .       Impression and Plan   Diagnosis     Current tobacco use (IPT23-OU Z72.0).     Hypertension (YZC83-CB I10).     Patient Instructions:       Counseled: Patient, Regarding diagnosis, Regarding treatment, Regarding medications, Diet, Activity.    Refuses colon cancer screening. RTC in one year and PRN.

## 2022-02-16 NOTE — PROGRESS NOTES
Patient:   MANNY DIAL            MRN: [id number]            FIN: [id number]               Age:   49 years     Sex:  Male     :  1970   Associated Diagnoses:   Right otitis externa   Author:   Maninder Spring MD      Visit Information      Date of Service: 2019 10:40 am  Performing Location: [facility]  Encounter#: [id number]      Primary Care Provider (PCP):  Tripp Dutta PA-C    NPI# [id number]      Referring Provider:  Maninder Spring MD    NPI# [id number]      Chief Complaint   2019 10:44 AM CDT    Right ear pain and ringing     Chief complaint and symptoms noted above confirmed with patient.      History of Present Illness             The patient presents with earache.  The location of the earache is the right ear.  The earache is characterized by pain.  The severity of the earache is moderate.  The earache is constant.  Exacerbating factors consist of none.  Relieving factors consist of analgesics.  Associated symptoms consist of tinnitus.        Review of Systems   Constitutional:  Negative.    Ear/Nose/Mouth/Throat:  Negative except as documented in history of present illness.    Respiratory:  Negative.    Cardiovascular:  Negative.       Health Status   Allergies:    Allergic Reactions (Selected)  No Known Medication Allergies   Medications:  (Selected)   Prescriptions  Prescribed  amlodipine-telmisartan 10 mg-40 mg oral tablet: 1 tab(s), Oral, daily, Instructions: med change, # 90 tab(s), 3 Refill(s), Type: Maintenance, Pharmacy: [facility] #[id number], 1 tab(s) Oral daily,Instr:med change   Problem list:    All Problems  Hypertension / SNOMED CT 0590415960 / Confirmed      Histories   Past Medical History:    No active or resolved past medical history items have been selected or recorded.   Family History:    No family history items have been selected or recorded.   Procedure history:    No active procedure history items have been selected or recorded.   Social  History:             No active social history items have been recorded.      Physical Examination   Vital Signs   6/6/2019 10:44 AM CDT Temperature Tympanic 98.0 DegF    Peripheral Pulse Rate 80 bpm    Pulse Site Radial artery    HR Method Manual    Systolic Blood Pressure 140 mmHg  HI    Diastolic Blood Pressure 78 mmHg    Mean Arterial Pressure 99 mmHg    BP Site Left arm    BP Method Manual      Measurements from flowsheet : Measurements   6/6/2019 10:44 AM CDT Height Measured - Standard 66.5 in    Weight Measured - Standard 151.4 lb    BSA 1.79 m2    Body Mass Index 24.07 kg/m2      Documented vital signs:       Blood Pressure: Systolic  130  mmHg, Diastolic  72  mmHg.    General:  Alert and oriented, No acute distress.    HENT:  Normocephalic, Tympanic membranes are clear.         Ear: Right ear, Canal ( puss in canal ).    Neck:  Supple.    Respiratory:  Lungs are clear to auscultation.    Cardiovascular:  Normal rate, Regular rhythm.       Impression and Plan   Diagnosis     Right otitis externa (RTR10-VW H60.91).     Course:  Progressing as expected.    Orders     Orders (Selected)   Prescriptions  Prescribe  amoxicillin-clavulanate 875 mg-125 mg oral tablet: = 1 tab(s), PO, q12hr, x 7 day(s), # 14 tab(s), 0 Refill(s), Type: Acute, Pharmacy: [facility], 1 tab(s) Oral q12 hrs,x7 day(s)  hydrocortisone/neomycin/polymyxin B 1%-0.35%-10,000 units/mL otic solution: 2 drop(s), Ear-Right, QID, x 7 day(s), # 10 mL, 0 Refill(s), Type: Acute, Pharmacy: [facility], 2 drop(s) Ear-Right qid,x7 day(s).     Orders     F/U  if not improving, sooner if getting worse.

## 2022-02-16 NOTE — NURSING NOTE
Comprehensive Intake Entered On:  6/6/2019 10:47 AM CDT    Performed On:  6/6/2019 10:44 AM CDT by Ora Burton MA               Summary   Chief Complaint :   Right ear pain and ringing    Weight Measured :   151.4 lb(Converted to: 151 lb 6 oz, 68.67 kg)    Height Measured :   66.5 in(Converted to: 5 ft 6 in, 168.91 cm)    Body Mass Index :   24.07 kg/m2   Body Surface Area :   1.79 m2   Systolic Blood Pressure :   140 mmHg (HI)    Diastolic Blood Pressure :   78 mmHg   Mean Arterial Pressure :   99 mmHg   Peripheral Pulse Rate :   80 bpm   BP Site :   Left arm   Pulse Site :   Radial artery   BP Method :   Manual   HR Method :   Manual   Temperature Tympanic :   98.0 DegF(Converted to: 36.7 DegC)    Ora Burton MA - 6/6/2019 10:44 AM CDT   Health Status   Allergies Verified? :   Yes   Medication History Verified? :   Yes   Medical History Verified? :   Yes   Pre-Visit Planning Status :   Completed   Tobacco Use? :   Current every day smoker   Ora Burton MA - 6/6/2019 10:44 AM CDT   Consents   Consent for Immunization Exchange :   Consent Granted   Consent for Immunizations to Providers :   Consent Granted   Ora Burton MA - 6/6/2019 10:44 AM CDT   Meds / Allergies   (As Of: 6/6/2019 10:47:54 AM CDT)   Allergies (Active)   No Known Medication Allergies  Estimated Onset Date:   Unspecified ; Created By:   Kelli Teague CMA; Reaction Status:   Active ; Category:   Drug ; Substance:   No Known Medication Allergies ; Type:   Allergy ; Updated By:   Kelli Teague CMA; Reviewed Date:   6/6/2019 10:45 AM CDT        Medication List   (As Of: 6/6/2019 10:47:54 AM CDT)   Prescription/Discharge Order    amlodipine-telmisartan  :   amlodipine-telmisartan ; Status:   Prescribed ; Ordered As Mnemonic:   amlodipine-telmisartan 10 mg-40 mg oral tablet ; Simple Display Line:   1 tab(s), Oral, daily, med change, 90 tab(s), 3 Refill(s) ; Ordering Provider:   Tripp Dutta PA-C; Catalog Code:   amlodipine-telmisartan ;  Order Dt/Tm:   8/24/2018 3:54:45 PM
